# Patient Record
Sex: MALE | Race: WHITE | URBAN - METROPOLITAN AREA
[De-identification: names, ages, dates, MRNs, and addresses within clinical notes are randomized per-mention and may not be internally consistent; named-entity substitution may affect disease eponyms.]

---

## 2020-10-05 ENCOUNTER — EMERGENCY (EMERGENCY)
Facility: HOSPITAL | Age: 30
LOS: 0 days | Discharge: HOME | End: 2020-10-05
Payer: COMMERCIAL

## 2020-10-05 VITALS
HEART RATE: 79 BPM | SYSTOLIC BLOOD PRESSURE: 128 MMHG | RESPIRATION RATE: 16 BRPM | DIASTOLIC BLOOD PRESSURE: 76 MMHG | TEMPERATURE: 98 F | OXYGEN SATURATION: 100 %

## 2020-10-05 DIAGNOSIS — Z20.828 CONTACT WITH AND (SUSPECTED) EXPOSURE TO OTHER VIRAL COMMUNICABLE DISEASES: ICD-10-CM

## 2020-10-05 DIAGNOSIS — R53.83 OTHER FATIGUE: ICD-10-CM

## 2020-10-05 PROCEDURE — 99283 EMERGENCY DEPT VISIT LOW MDM: CPT

## 2020-10-05 PROCEDURE — U0003: CPT

## 2020-10-05 NOTE — ED STATDOCS - PATIENT PORTAL LINK FT
You can access the FollowMyHealth Patient Portal offered by [facility] by registering at the following website: [email or web address]. By joining [employer]’s FollowMyHealth portal, you will also be able to view your health information using other applications (apps) compatible with our system.

## 2020-10-05 NOTE — ED STATDOCS - CLINICAL SUMMARY MEDICAL DECISION MAKING FREE TEXT BOX
Pt with fatigue and COVID-19 exposure here for testing. Pt well-appearing, will swab and DC. Return precautions given.

## 2020-10-05 NOTE — ED STATDOCS - NS ED ROS FT
ROS: Constitutional- -fever, -chills, -body aches, +fatigue.  Respiratory- -cough, -SOB  Cardiac- no chest pain, no palpitations, ENT- -rhinorrhea, no sore throat, no congestion.  Abdomen- No nausea, no vomiting, no diarrhea.  Urinary- no dysuria, no urgency, no frequency.  Skin- No rashes. Neuro-  -HA

## 2020-10-05 NOTE — ED STATDOCS - NSFOLLOWUPINSTRUCTIONS_ED_ALL_ED_FT
Pt here for COVID-19 testing. Pt non toxic. Pt was swabbed for COVID-19 in their car in [place] area. [facility] will call pt with results. return precautions given. Home self-quarantine recommended. Pt agrees with plan of  care.

## 2020-10-06 LAB — SARS-COV-2 RNA SPEC QL NAA+PROBE: SIGNIFICANT CHANGE UP
